# Patient Record
Sex: MALE | Employment: FULL TIME
[De-identification: names, ages, dates, MRNs, and addresses within clinical notes are randomized per-mention and may not be internally consistent; named-entity substitution may affect disease eponyms.]

---

## 2023-02-16 ENCOUNTER — NURSE TRIAGE (OUTPATIENT)
Dept: OTHER | Facility: CLINIC | Age: 53
End: 2023-02-16

## 2023-02-16 NOTE — TELEPHONE ENCOUNTER
Location of patient: [place]    Received call from Cici Diaz at [facility]; Patient with [employer] Complaint requesting to establish care with [facility]. [facility] has a new patient appointment scheduled for 2/22/2023      Current Symptoms: blood in semen, \"pressure\" in right testicle, headache, intermittent fever, legs aching    Onset: 1 weeks ago;       Pain Severity: 3/10; Temperature: \"feels warm\"    What has been tried: ibuprofen    Denies - discharge from penis / difficulty with urination / swelling to scrotum      Recommended disposition: See in Office Today  Patient does not have a PCP and was advised to go to an [address] [place], [place] [address] for current problem. Care advice provided, patient verbalizes understanding; denies any other questions or concerns; instructed to call back for any new or worsening symptoms. Patient/Caller agrees with recommended disposition; writer provided warm transfer to Gustabo Augustin at [facility] for appointment scheduling    Attention Provider: Thank you for allowing me to participate in the care of your patient. The patient was connected to triage in response to information provided to the [facility]. Please do not respond through this encounter as the response is not directed to a shared pool.     Reason for Disposition   Pain comes and goes (intermittent) and present > 24 hours    Protocols used: Scrotal Pain-ADULT-[place]

## 2023-02-21 LAB — PROSTATE SPECIFIC ANTIGEN: 0.5 NG/ML

## 2023-02-22 ENCOUNTER — OFFICE VISIT (OUTPATIENT)
Dept: FAMILY MEDICINE CLINIC | Facility: CLINIC | Age: 53
End: 2023-02-22
Payer: COMMERCIAL

## 2023-02-22 VITALS
HEIGHT: 73 IN | SYSTOLIC BLOOD PRESSURE: 144 MMHG | DIASTOLIC BLOOD PRESSURE: 98 MMHG | BODY MASS INDEX: 29.29 KG/M2 | OXYGEN SATURATION: 98 % | HEART RATE: 81 BPM | WEIGHT: 221 LBS

## 2023-02-22 DIAGNOSIS — R36.1 HEMATOSPERMIA: ICD-10-CM

## 2023-02-22 DIAGNOSIS — Z12.11 COLON CANCER SCREENING: ICD-10-CM

## 2023-02-22 DIAGNOSIS — R31.9 HEMATURIA, UNSPECIFIED TYPE: ICD-10-CM

## 2023-02-22 DIAGNOSIS — Z00.00 HEALTHCARE MAINTENANCE: Primary | ICD-10-CM

## 2023-02-22 PROCEDURE — 99203 OFFICE O/P NEW LOW 30 MIN: CPT | Performed by: FAMILY MEDICINE

## 2023-02-22 SDOH — HEALTH STABILITY: PHYSICAL HEALTH: ON AVERAGE, HOW MANY DAYS PER WEEK DO YOU ENGAGE IN MODERATE TO STRENUOUS EXERCISE (LIKE A BRISK WALK)?: 6 DAYS

## 2023-02-22 SDOH — ECONOMIC STABILITY: HOUSING INSECURITY
IN THE LAST 12 MONTHS, WAS THERE A TIME WHEN YOU DID NOT HAVE A STEADY PLACE TO SLEEP OR SLEPT IN A SHELTER (INCLUDING NOW)?: NO

## 2023-02-22 SDOH — ECONOMIC STABILITY: FOOD INSECURITY: WITHIN THE PAST 12 MONTHS, THE FOOD YOU BOUGHT JUST DIDN'T LAST AND YOU DIDN'T HAVE MONEY TO GET MORE.: NEVER TRUE

## 2023-02-22 SDOH — HEALTH STABILITY: PHYSICAL HEALTH: ON AVERAGE, HOW MANY MINUTES DO YOU ENGAGE IN EXERCISE AT THIS LEVEL?: 120 MIN

## 2023-02-22 SDOH — ECONOMIC STABILITY: FOOD INSECURITY: WITHIN THE PAST 12 MONTHS, YOU WORRIED THAT YOUR FOOD WOULD RUN OUT BEFORE YOU GOT MONEY TO BUY MORE.: NEVER TRUE

## 2023-02-22 SDOH — ECONOMIC STABILITY: INCOME INSECURITY: HOW HARD IS IT FOR YOU TO PAY FOR THE VERY BASICS LIKE FOOD, HOUSING, MEDICAL CARE, AND HEATING?: NOT HARD AT ALL

## 2023-02-22 ASSESSMENT — PATIENT HEALTH QUESTIONNAIRE - PHQ9
SUM OF ALL RESPONSES TO PHQ9 QUESTIONS 1 & 2: 1
SUM OF ALL RESPONSES TO PHQ QUESTIONS 1-9: 1
2. FEELING DOWN, DEPRESSED OR HOPELESS: 1
1. LITTLE INTEREST OR PLEASURE IN DOING THINGS: 0
SUM OF ALL RESPONSES TO PHQ QUESTIONS 1-9: 1

## 2023-02-22 ASSESSMENT — SOCIAL DETERMINANTS OF HEALTH (SDOH)
WITHIN THE LAST YEAR, HAVE TO BEEN RAPED OR FORCED TO HAVE ANY KIND OF SEXUAL ACTIVITY BY YOUR PARTNER OR EX-PARTNER?: NO
WITHIN THE LAST YEAR, HAVE YOU BEEN HUMILIATED OR EMOTIONALLY ABUSED IN OTHER WAYS BY YOUR PARTNER OR EX-PARTNER?: NO
WITHIN THE LAST YEAR, HAVE YOU BEEN KICKED, HIT, SLAPPED, OR OTHERWISE PHYSICALLY HURT BY YOUR PARTNER OR EX-PARTNER?: NO
WITHIN THE LAST YEAR, HAVE YOU BEEN AFRAID OF YOUR PARTNER OR EX-PARTNER?: NO

## 2023-02-22 ASSESSMENT — ENCOUNTER SYMPTOMS: RESPIRATORY NEGATIVE: 1

## 2023-02-22 NOTE — PROGRESS NOTES
PROGRESS NOTE    SUBJECTIVE:   Tammy Moreno is a 48 y.o. male seen for a follow up visit regarding   Chief Complaint   Patient presents with    New Patient     Establish care  Reports 2 weeks ago he noticed blood in his semen. Didn't have sex again until Sunday; noticed blood again. Denies any pain; some pressure on right side of testicle. Noticed blood in urine yesterday at went to [facility]. Reports not finding anything wrong on UA. HPI:  New patient wanting to establish primary care here. Recently had an episode of hematospermia without any other associated symptoms. No dysuria, no pain with sex. This happened again several days later, again relatively asymptomatic. He endorses possibly some discomfort in the right testicle. Several days later he had an episode of gross hematuria while at work. He went to the urgent care where dip urinalysis was performed and was completely normal.  There is no prior history of hematuria or hematospermia. He is healthy, on no medications. He does occasionally ride a bicycle, but nothing aggressive. Reviewed and updated this visit by provider:  Tobacco  Allergies  Meds  Problems  Med Hx  Surg Hx  Fam Hx           Review of Systems   Respiratory: Negative. Cardiovascular: Negative. OBJECTIVE:  Vitals:    02/22/23 1609   BP: (!) 144/98   Site: Left Upper Arm   Cuff Size: Large Adult   Pulse: 81   SpO2: 98%   Weight: 221 lb (100.2 kg)   Height: 6' 1\" (1.854 m)        Physical Exam   General: Alert and oriented x3, well-appearing  : Normal-appearing male genitalia, no penile lesions, no testicular tenderness or nodule. No inguinal moles. Medical problems and test results were reviewed with the patient today. No results found for this or any previous visit (from the past 672 hour(s)). No results found for any visits on 02/22/23. ASSESSMENT and PLAN    1. Healthcare maintenance  -     Hepatic Function Panel;  Future  -     Hepatitis C Antibody; Future  -     HIV 1/2 Ag/Ab, 4TH Generation,W Rflx Confirm; Future  -     Lipid Panel; Future  -     PSA Screening; Future  -     TSH; Future  2. Colon cancer screening  -     [address] [facility] [facility]  3. Hematuria, unspecified type  -     [facility]  4. Hematospermia  -     [facility]     Patient counseled today, very unlikely his hematospermia is due to anything that is a threat to him. Due to his age, gross hematuria yesterday, vague right testicular discomfort, will refer to urology. Return if symptoms worsen or fail to improve.        Sebastián Montalvo MD

## 2023-03-02 ENCOUNTER — TELEPHONE (OUTPATIENT)
Dept: INTERNAL MEDICINE CLINIC | Facility: CLINIC | Age: 53
End: 2023-03-02

## 2023-03-02 NOTE — TELEPHONE ENCOUNTER
----- Message from [facility] sent at 3/2/2023  9:48 AM EST -----  Subject: Referral Request    Reason for referral request? Urology  Provider patient wants to be referred to(if known):     Provider Phone Number(if known): Additional Information for Provider?  Please call Amarilys Altamirano when the referral is   in the system and with any questions and concerns  ---------------------------------------------------------------------------  --------------  [email or web address]    [phone]; OK to leave message on voicemail  ---------------------------------------------------------------------------  --------------

## 2023-03-02 NOTE — TELEPHONE ENCOUNTER
Referral has already been sent. Sent to [facility]. [phone]. Patient contacted with contact information to Urology. Voiced understanding and thanks.

## 2023-03-03 ENCOUNTER — NURSE ONLY (OUTPATIENT)
Dept: FAMILY MEDICINE CLINIC | Facility: CLINIC | Age: 53
End: 2023-03-03

## 2023-03-03 DIAGNOSIS — Z00.00 HEALTHCARE MAINTENANCE: ICD-10-CM

## 2023-03-04 LAB — PSA SERPL-MCNC: 0.5 NG/ML

## 2023-04-07 ENCOUNTER — OFFICE VISIT (OUTPATIENT)
Dept: UROLOGY | Age: 53
End: 2023-04-07

## 2023-04-07 DIAGNOSIS — R31.9 HEMATURIA, UNSPECIFIED TYPE: Primary | ICD-10-CM

## 2023-04-07 DIAGNOSIS — R36.1 HEMATOSPERMIA: ICD-10-CM

## 2023-04-07 LAB
BILIRUBIN, URINE, POC: NEGATIVE
BLOOD URINE, POC: NEGATIVE
GLUCOSE URINE, POC: NEGATIVE
KETONES, URINE, POC: NEGATIVE
LEUKOCYTE ESTERASE, URINE, POC: NEGATIVE
NITRITE, URINE, POC: NEGATIVE
PH, URINE, POC: 6.5 (ref 4.6–8)
PROTEIN,URINE, POC: NEGATIVE
SPECIFIC GRAVITY, URINE, POC: 1.01 (ref 1–1.03)
URINALYSIS CLARITY, POC: NORMAL
URINALYSIS COLOR, POC: NORMAL
UROBILINOGEN, POC: NORMAL

## 2023-04-07 ASSESSMENT — ENCOUNTER SYMPTOMS
BLOOD IN STOOL: 0
CONSTIPATION: 0
WHEEZING: 0
SHORTNESS OF BREATH: 0
EYE PAIN: 0
NAUSEA: 0
COUGH: 0
HEARTBURN: 0
BACK PAIN: 0
VOMITING: 0
INDIGESTION: 0
DIARRHEA: 0
ABDOMINAL PAIN: 0
SKIN LESIONS: 0
EYE DISCHARGE: 0

## 2023-04-07 NOTE — PROGRESS NOTES
Gravity, Urine, POC 1.010 1.001 - 1.035    Blood (UA POC) Negative Negative    pH, Urine, POC 6.5 4.6 - 8.0    Protein, Urine, POC Negative Negative    Urobilinogen, POC 0.2 mg/dL     Nitrite, Urine, POC Negative Negative    Leukocyte Esterase, Urine, POC Negative Negative       UA - Micro  WBC - 0  RBC - 0  Bacteria - 0  Epith - 0    Assessment and Plan    Diagnoses and all orders for this visit:    Hematuria, unspecified type  -     AMB POC URINALYSIS DIP STICK AUTO W/O MICRO  -     CT ABDOMEN PELVIS W WO CONTRAST Additional Contrast? Radiologist Recommendation; Future    Hematospermia       Plan hematuria workup. Follow-up and Dispositions    Return in 3 weeks (on 4/28/2023) for cysto.

## 2023-04-18 ENCOUNTER — HOSPITAL ENCOUNTER (OUTPATIENT)
Dept: CT IMAGING | Age: 53
Discharge: HOME | End: 2023-04-21
Payer: COMMERCIAL

## 2023-04-18 DIAGNOSIS — R31.9 HEMATURIA, UNSPECIFIED TYPE: ICD-10-CM

## 2023-04-18 PROCEDURE — 74178 CT ABD&PLV WO CNTR FLWD CNTR: CPT

## 2023-04-18 PROCEDURE — 2580000003 HC RX 258: Performed by: UROLOGY

## 2023-04-18 PROCEDURE — 6360000004 HC RX CONTRAST MEDICATION: Performed by: UROLOGY

## 2023-04-18 RX ORDER — 0.9 % SODIUM CHLORIDE 0.9 %
100 INTRAVENOUS SOLUTION INTRAVENOUS
Status: COMPLETED | OUTPATIENT
Start: 2023-04-18 | End: 2023-04-18

## 2023-04-18 RX ORDER — SODIUM CHLORIDE 0.9 % (FLUSH) 0.9 %
10 SYRINGE (ML) INJECTION
Status: COMPLETED | OUTPATIENT
Start: 2023-04-18 | End: 2023-04-18

## 2023-04-18 RX ADMIN — IOPAMIDOL 100 ML: 755 INJECTION, SOLUTION INTRAVENOUS at 15:07

## 2023-04-18 RX ADMIN — SODIUM CHLORIDE 100 ML: 9 INJECTION, SOLUTION INTRAVENOUS at 15:08

## 2023-04-18 RX ADMIN — SODIUM CHLORIDE, PRESERVATIVE FREE 10 ML: 5 INJECTION INTRAVENOUS at 15:08

## 2023-06-21 ENCOUNTER — TELEPHONE (OUTPATIENT)
Dept: GASTROENTEROLOGY | Age: 53
End: 2023-06-21

## 2025-04-03 ENCOUNTER — RESULTS FOLLOW-UP (OUTPATIENT)
Dept: FAMILY MEDICINE CLINIC | Facility: CLINIC | Age: 55
End: 2025-04-03

## 2025-04-03 ENCOUNTER — OFFICE VISIT (OUTPATIENT)
Dept: FAMILY MEDICINE CLINIC | Facility: CLINIC | Age: 55
End: 2025-04-03
Payer: COMMERCIAL

## 2025-04-03 VITALS
HEIGHT: 73 IN | DIASTOLIC BLOOD PRESSURE: 80 MMHG | RESPIRATION RATE: 16 BRPM | WEIGHT: 228.8 LBS | BODY MASS INDEX: 30.32 KG/M2 | SYSTOLIC BLOOD PRESSURE: 124 MMHG

## 2025-04-03 DIAGNOSIS — R10.31 RIGHT LOWER QUADRANT ABDOMINAL PAIN: Primary | ICD-10-CM

## 2025-04-03 LAB
ALBUMIN SERPL-MCNC: 4 G/DL (ref 3.5–5)
ALBUMIN/GLOB SERPL: 1.2 (ref 1–1.9)
ALP SERPL-CCNC: 58 U/L (ref 40–129)
ALT SERPL-CCNC: 20 U/L (ref 8–55)
ANION GAP SERPL CALC-SCNC: 11 MMOL/L (ref 7–16)
AST SERPL-CCNC: 27 U/L (ref 15–37)
BASOPHILS # BLD: 0.02 K/UL (ref 0–0.2)
BASOPHILS NFR BLD: 0.4 % (ref 0–2)
BILIRUB SERPL-MCNC: 0.4 MG/DL (ref 0–1.2)
BUN SERPL-MCNC: 19 MG/DL (ref 6–23)
CALCIUM SERPL-MCNC: 9.3 MG/DL (ref 8.8–10.2)
CHLORIDE SERPL-SCNC: 105 MMOL/L (ref 98–107)
CO2 SERPL-SCNC: 24 MMOL/L (ref 20–29)
CREAT SERPL-MCNC: 1.08 MG/DL (ref 0.8–1.3)
CRP SERPL-MCNC: <0.3 MG/DL (ref 0–0.4)
DIFFERENTIAL METHOD BLD: ABNORMAL
EOSINOPHIL # BLD: 0.08 K/UL (ref 0–0.8)
EOSINOPHIL NFR BLD: 1.5 % (ref 0.5–7.8)
ERYTHROCYTE [DISTWIDTH] IN BLOOD BY AUTOMATED COUNT: 12.1 % (ref 11.9–14.6)
ERYTHROCYTE [SEDIMENTATION RATE] IN BLOOD: 10 MM/HR
GLOBULIN SER CALC-MCNC: 3.3 G/DL (ref 2.3–3.5)
GLUCOSE SERPL-MCNC: 94 MG/DL (ref 70–99)
HCT VFR BLD AUTO: 42.3 % (ref 41.1–50.3)
HGB BLD-MCNC: 13.8 G/DL (ref 13.6–17.2)
IMM GRANULOCYTES # BLD AUTO: 0.02 K/UL (ref 0–0.5)
IMM GRANULOCYTES NFR BLD AUTO: 0.4 % (ref 0–5)
LYMPHOCYTES # BLD: 1.63 K/UL (ref 0.5–4.6)
LYMPHOCYTES NFR BLD: 30 % (ref 13–44)
MCH RBC QN AUTO: 30.3 PG (ref 26.1–32.9)
MCHC RBC AUTO-ENTMCNC: 32.6 G/DL (ref 31.4–35)
MCV RBC AUTO: 92.8 FL (ref 82–102)
MONOCYTES # BLD: 0.58 K/UL (ref 0.1–1.3)
MONOCYTES NFR BLD: 10.7 % (ref 4–12)
NEUTS SEG # BLD: 3.1 K/UL (ref 1.7–8.2)
NEUTS SEG NFR BLD: 57 % (ref 43–78)
NRBC # BLD: 0 K/UL (ref 0–0.2)
PLATELET # BLD AUTO: 252 K/UL (ref 150–450)
PMV BLD AUTO: 9 FL (ref 9.4–12.3)
POTASSIUM SERPL-SCNC: 4 MMOL/L (ref 3.5–5.1)
PROT SERPL-MCNC: 7.2 G/DL (ref 6.3–8.2)
RBC # BLD AUTO: 4.56 M/UL (ref 4.23–5.6)
SODIUM SERPL-SCNC: 140 MMOL/L (ref 136–145)
WBC # BLD AUTO: 5.4 K/UL (ref 4.3–11.1)

## 2025-04-03 PROCEDURE — 99214 OFFICE O/P EST MOD 30 MIN: CPT | Performed by: FAMILY MEDICINE

## 2025-04-03 SDOH — ECONOMIC STABILITY: FOOD INSECURITY: WITHIN THE PAST 12 MONTHS, THE FOOD YOU BOUGHT JUST DIDN'T LAST AND YOU DIDN'T HAVE MONEY TO GET MORE.: NEVER TRUE

## 2025-04-03 SDOH — ECONOMIC STABILITY: FOOD INSECURITY: WITHIN THE PAST 12 MONTHS, YOU WORRIED THAT YOUR FOOD WOULD RUN OUT BEFORE YOU GOT MONEY TO BUY MORE.: NEVER TRUE

## 2025-04-03 ASSESSMENT — PATIENT HEALTH QUESTIONNAIRE - PHQ9
SUM OF ALL RESPONSES TO PHQ QUESTIONS 1-9: 0
1. LITTLE INTEREST OR PLEASURE IN DOING THINGS: NOT AT ALL
SUM OF ALL RESPONSES TO PHQ QUESTIONS 1-9: 0
2. FEELING DOWN, DEPRESSED OR HOPELESS: NOT AT ALL

## 2025-04-03 ASSESSMENT — ENCOUNTER SYMPTOMS
BLOOD IN STOOL: 0
RESPIRATORY NEGATIVE: 1
NAUSEA: 0
DIARRHEA: 0
ABDOMINAL PAIN: 1
CONSTIPATION: 0
ABDOMINAL DISTENTION: 0

## 2025-04-04 NOTE — PROGRESS NOTES
PROGRESS NOTE    SUBJECTIVE:   Howard Moreno is a 55 y.o. male seen for a follow up visit regarding   Chief Complaint   Patient presents with    Abdominal Pain        HPI:  Patient comes in today reporting abdominal pain for 4 to 5 weeks.  He points to the midportion of his right lower quadrant, describes this pain as a burning pain at times.  Comes and goes, not debilitating but has been there for quite some time now.  There does not seem to be any provocative factors, is not associated with meals, has not made worse or better with bowel movements.  There has been no change in his bowel habits.  He is seeing no blood in his urine.         Reviewed and updated this visit by provider:  Tobacco  Allergies  Meds  Problems  Med Hx  Surg Hx  Fam Hx           Review of Systems   Constitutional:  Negative for fever.   Respiratory: Negative.     Cardiovascular: Negative.    Gastrointestinal:  Positive for abdominal pain. Negative for abdominal distention, blood in stool, constipation, diarrhea and nausea.   Genitourinary:  Negative for hematuria.          OBJECTIVE:  Vitals:    04/03/25 1559   BP: 124/80   Resp: 16   Weight: 103.8 kg (228 lb 12.8 oz)   Height: 1.854 m (6' 1\")        Physical Exam   General: Alert and oriented x3, well-appearing  Neck: No adenopathy, thyromegaly or thyroid nodules  Pulmonary: Normal effort, good airflow, no rales or rhonchi  CVS: Regular rate and rhythm, normal S1, S2, no S3 or S4, no murmurs; no carotid bruits, 2+ pedal pulses  Abdomen: Nondistended with normal bowel sounds.  There is mild discomfort palpation of the midportion of the right lower quadrant.  No mass appreciated     Medical problems and test results were reviewed with the patient today.     Recent Results (from the past 4 weeks)   Sedimentation Rate    Collection Time: 04/03/25  4:14 PM   Result Value Ref Range    Sed Rate, Automated 10 (L) 15 mm/hr   C-Reactive Protein    Collection Time: 04/03/25  4:14 PM   Result

## 2025-04-10 ENCOUNTER — TELEPHONE (OUTPATIENT)
Dept: FAMILY MEDICINE CLINIC | Facility: CLINIC | Age: 55
End: 2025-04-10

## 2025-04-10 DIAGNOSIS — R10.31 RIGHT LOWER QUADRANT ABDOMINAL PAIN: Primary | ICD-10-CM

## 2025-04-10 NOTE — TELEPHONE ENCOUNTER
Auth dept called and stated patients CT of abdomen has been denied needs a peer to peer. They are aware provider is out of the office. # to insurance [phone] abd tracking # [id number]

## 2025-04-18 ENCOUNTER — TELEPHONE (OUTPATIENT)
Dept: FAMILY MEDICINE CLINIC | Facility: CLINIC | Age: 55
End: 2025-04-18

## 2025-04-18 NOTE — TELEPHONE ENCOUNTER
[facility] authorization department called and states that the imaging CT Abdomen Pelvis W IV Contrast was denied even with authorization completed. Insurance is requesting a [id number] to be completed for this. Would like a call from provider at [phone].    States that the Tracking number for this is:   [id number]

## 2025-06-05 ENCOUNTER — COMMUNITY OUTREACH (OUTPATIENT)
Dept: FAMILY MEDICINE CLINIC | Facility: CLINIC | Age: 55
End: 2025-06-05

## 2025-06-05 NOTE — PROGRESS NOTES
Patient's HM shows they are overdue for Colorectal Screening.   Care Everywhere and  files searched.  No results to attach to order nor HM updated.       
JOINT PAIN